# Patient Record
Sex: FEMALE | Race: WHITE | Employment: UNEMPLOYED | URBAN - METROPOLITAN AREA
[De-identification: names, ages, dates, MRNs, and addresses within clinical notes are randomized per-mention and may not be internally consistent; named-entity substitution may affect disease eponyms.]

---

## 2024-09-26 ENCOUNTER — OFFICE VISIT (OUTPATIENT)
Facility: LOCATION | Age: 64
End: 2024-09-26

## 2024-09-26 ENCOUNTER — OFFICE VISIT (OUTPATIENT)
Facility: LOCATION | Age: 64
End: 2024-09-26
Payer: MEDICARE

## 2024-09-26 VITALS — WEIGHT: 151.63 LBS | HEIGHT: 70 IN | BODY MASS INDEX: 21.71 KG/M2

## 2024-09-26 DIAGNOSIS — H93.293 ABNORMAL AUDITORY PERCEPTION OF BOTH EARS: ICD-10-CM

## 2024-09-26 DIAGNOSIS — H90.3 ASYMMETRIC SNHL (SENSORINEURAL HEARING LOSS): Primary | ICD-10-CM

## 2024-09-26 DIAGNOSIS — G43.001 MIGRAINE WITHOUT AURA AND WITH STATUS MIGRAINOSUS, NOT INTRACTABLE: ICD-10-CM

## 2024-09-26 DIAGNOSIS — H81.01 MENIERE'S DISEASE OF RIGHT EAR: Primary | ICD-10-CM

## 2024-09-26 DIAGNOSIS — H93.8X3 EAR FULLNESS, BILATERAL: ICD-10-CM

## 2024-09-26 PROCEDURE — 92557 COMPREHENSIVE HEARING TEST: CPT | Performed by: AUDIOLOGIST

## 2024-09-26 PROCEDURE — 92567 TYMPANOMETRY: CPT | Performed by: AUDIOLOGIST

## 2024-09-26 RX ORDER — TRIAMTERENE AND HYDROCHLOROTHIAZIDE 37.5; 25 MG/1; MG/1
1 CAPSULE ORAL DAILY
COMMUNITY
Start: 2022-11-01

## 2024-09-26 RX ORDER — PREDNISONE 20 MG/1
TABLET ORAL
Qty: 10 TABLET | Refills: 0 | Status: SHIPPED | OUTPATIENT
Start: 2024-09-26 | End: 2024-10-04

## 2024-09-26 RX ORDER — NORTRIPTYLINE HCL 10 MG
20 CAPSULE ORAL NIGHTLY
Qty: 60 CAPSULE | Refills: 2 | Status: SHIPPED | OUTPATIENT
Start: 2024-09-26

## 2024-09-26 RX ORDER — CALCIUM CARBONATE 500 MG/1
500 TABLET, CHEWABLE ORAL
COMMUNITY

## 2024-09-26 RX ORDER — ACETAMINOPHEN 325 MG/1
650 TABLET ORAL
COMMUNITY

## 2024-09-26 RX ORDER — ALENDRONATE SODIUM 70 MG/1
TABLET ORAL
COMMUNITY
Start: 2023-09-26

## 2024-09-26 RX ORDER — MULTIVIT-MIN/IRON/FOLIC ACID/K 18-600-40
1 CAPSULE ORAL DAILY
COMMUNITY

## 2024-09-26 NOTE — PROGRESS NOTES
NEW PATIENT PROGRESS NOTE  OTOLOGY/OTOLARYNGOLOGY    REF MD:  No referring provider defined for this encounter.     PCP: Nicholas Stinson MD    CHIEF COMPLAINT:    Chief Complaint   Patient presents with    Ear Problem     Pain, pressure, and fullness in both ears     LAST VISIT AT [facility] - 12/12/2022    HISTORY OF PRESENT ILLNESS: Korin Preston is a 64 year old female who presents for evaluation of ear fullness. Patient reports that she was fine until one week ago when she began experiencing pain, pressure, and a sensation of fullness in her ears, accompanied by some nausea. She denies any symptoms of Meniere's disease. She tried Dyazide, but it did not make a significant difference. She is not under much stress and cannot identify any specific triggers. She has also discontinued caffeine consumption. Allergy medications didn't help.     LAST HPI AND INTERVAL HISTORY PER [facility] NOTE:  INTERVAL HX 12/12/22: Patient returns for routine follow-up. She tapered off nortriptyline. No longer having headaches or ear pain. She has still has mild hypersensitivity to sound which is manageable. She is still taking Dyazide. Has labs checked yearly by her primary care physician. Denies vertigo. Patient feels hearing is stable.    HISTORY OF PRESENT ILLNESS 11/4/20: Korin Preston is a 60y female who presents for evaluation of otalgia. The patient has been having sharp ear pains near daily. This can be triggered by certain metallic or loud noises. She also has daily headaches which are over the whole head and forehead. No history of ear infections, otorrhea. Denies vertigo, tinnitus. Hearing was last checked in 02/2020 and showed a low frequency right hearing loss. The patient has previously been seen by Dr. Laureano and was found to have possible right Meniere's disease with which she has not had problems since starting dyazide.     PAST MEDICAL HISTORY:    Past Medical History:    Meniere's disease       PAST SURGICAL  HISTORY:    Past Surgical History:   Procedure Laterality Date    Cholecystectomy  2022       Current Outpatient Medications on File Prior to Visit   Medication Sig Dispense Refill    alendronate 70 MG Oral Tab 12      omeprazole 20 MG Oral Capsule Delayed Release Take 1 capsule (20 mg total) by mouth daily.      sertraline 50 MG Oral Tab 135      triamterene-hydroCHLOROthiazide 37.5-25 MG Oral Cap Take 1 capsule by mouth daily.      acetaminophen 325 MG Oral Tab Take 2 tablets (650 mg total) by mouth.      calcium carbonate 500 MG Oral Chew Tab Chew 1 tablet (500 mg total) by mouth.      Cholecalciferol (VITAMIN D) 50 MCG (2000 UT) Oral Cap Take 1 tablet by mouth daily.       No current facility-administered medications on file prior to visit.       Allergies:   Allergies   Allergen Reactions    Amoxicillin-Pot Clavulanate RASH     Tolerated ceftriaxone 5/24/22    Sulfamethoxazole W/Trimethoprim RASH       SOCIAL HISTORY:    Social History     Tobacco Use    Smoking status: Never    Smokeless tobacco: Never   Substance Use Topics    Alcohol use: Yes     Comment: Occ       FAMILY HISTORY: Denies known family history of hearing loss, tinnitus, vertigo, or migraine.  Denies known family history of head and neck cancer, thyroid cancer, bleeding disorders.     REVIEW OF SYSTEMS:   Positives are in bold  Neuro: Headache, facial weakness, facial numbness, neck pain, vertigo  ENT: Hearing change, tinnitus, otorrhea, otalgia, aural fullness, ear pressure, vertigo, imbalance  Sinus pressure, rhinorrhea, congestion, facial pain, jaw pain, dysphagia, odynophagia, sore throat, voice changes, shortness of breath    EXAMINATION:  I washed my hands with an alcohol-based hand gel prior to examination  Constitutional:   --Vitals: Height 5' 10\" (1.778 m), weight 151 lb 9.6 oz (68.8 kg).  --General: no apparent distress, well-developed, conversant  Psych: affect pleasant and appropriate for age, alert and oriented  Neuro: Facial  movement normal bilateral  Eyes: Pupils equal, symmetric and reactive to light.  Extra-ocular muscles intact  Respiratory: No stridor, stertor or increased work of breathing  ENT:  --Ear: The bilateral ears were examined under binocular microscopy  Right ear microscopic exam:  Pinna: Normal, no lesions or masses.  Mastoid: Nontender on palpation.   External auditory canal: Clear, no masses or lesions.  Tympanic membrane: Intact, no lesions, normal landmarks.  Middle ear: Aerated.    Left ear microscopic exam:  Pinna: Normal, no lesions or masses.  Mastoid: Nontender on palpation.   External auditory canal: Clear, no masses or lesions.  Tympanic membrane: Intact, no lesions, normal landmarks.  Middle ear: Aerated.    Audiogram (date 12/16/20) interpreted and reviewed with the patient today showing:  Puretones: Right: moderate to normal SNHL Left: normal to mild SNHL  SRT: Right: 20 Left: 15  SD: Right: 100 Left: 100    Audiogram (date 2/28/20) interpreted and reviewed with the patient today showing:  Puretones: Right: moderate to mild SNHL Left: normal to mild SNHL  Tymps: Right: A Left: A  SRT: Right: 20 Left: 10  SD: Right: 100 Left: 100    Audiogram (date 12/12/2022) interpreted and reviewed with the patient today showing:  Puretones: Right: mild to normal to mild SNHL Left: normal to mild SNHL  SRT: Right: 5 Left: 10  SD: Right: 100 Left: 100     MRI IACS wo Contrast 5/13/2020  IMPRESSION:  A few scattered nonspecific punctate foci of increased signal intensity are seen  in the subcortical white matter without diffusion weighted correlate.    Images through the posterior fossa demonstrate normal morphology of the seventh  8th nerve complex bilaterally.    Study was performed without contrast which somewhat limits the sensitivity for  small lesions.     Latest Audiogram Result (Hz) Exam performed: 9/26/2024 2:05 PM Last edited by Tita Park AUD on 9/26/2024 2:16 PM        125 250  1500 2000  3000 4000 6000 8000    Right air:  20 30  40  45  50 60 65    Left air:  10 10  10  10  15 35 35    Right mastoid bone (masked):   20  40  40  45         Reliability:  Good    Transducer:  Inserts, Bone Oscillator    Technique:  Conventional Audiometry    Comments:            Latest Speech Audiometry  Last edited by Tita Park AUD on 9/26/2024 2:16 PM       Ear Method PTA SAT SRT [facility] Test/list Score (%) Intensity Mask/noise Notes    right recorded   30   10 By Difficulty 92 70  masked    left recorded   10   10 By Difficulty 100 55                    Latest Tympanogram Result       Probe Tone (Hz): 226 Exam performed: 9/26/2024 2:05 PM Last edited by Tita Park AUD on 9/26/2024 2:16 PM      Tympanograms  These were drawn by a user, not generated from device data      Right Ear Left Ear                     Right Ear Left Ear    Tympanogram type: Type A Type A    Canal volume (mL): 1.7 1.8    Peak pressure (daPa): 0 -13    Peak amplitude (mmho): 0.4 0.4    Tympanogram width (daPa):        Comments:                    Latest Audiogram and Tympanogram Result Text  Last edited by Tita Park AUD on 9/26/2024  2:34 PM      Addendum      HISTORY:    Korin Preston, 64 year old, was seen today for hearing assessment as requested by otolaryngologist Amor Sanches MD secondary to complaints of bilateral ear fullness and previous diagnosis of Meniere's disease.   See ENT ROS intake form for detailed history.    RESULTS:    Tympanograms were consistent with normal pressure and compliance in each ear.      Pure tone air and bone conduction thresholds were consistent with mild to moderate-severe sensorineural hearing loss in the right ear and normal hearing sensitivity through 4000 Hz, sloping to mild hearing loss in the left ear.     SRT:  Right: 30 dB HL            Left :  10 dB HL    WRS:  Right: >92% at 70 dB HL             Left: 100% at 55 dB HL    RECOMMENDATIONS:  1.   Follow-up with Amor Sanches MD.   2. Audiological monitoring as needed during the course of medical management.         Addended by Tita Park, AUD on 9/26/2024  2:34 PM                ASSESSMENT/PLAN:  Korin Preston is a 64 year old female with     ICD-10-CM   1. Meniere's disease of right ear  H81.01   2. Migraine without aura and with status migrainosus, not intractable  G43.001        IMPRESSION:  Migraine without aura   Otalgia, bilateral, likely neurologic   Right Meniere's disease     PLAN:  - Restart nortriptyline 20 MG  - Possible side effects include fatigue, constipation, dry mouth, vivid dreams.  More rarely patients can experience increased blood pressure or tachycardia.  At this low-dose the medication can be stopped without a taper.  Most patients take the medication before bed as it can help them sleep well.  However if the patient feels that it keeps them awake at night they can take it during the daytime.   - Restart dyazide daily  - Start Prednisone burst and taper  Short term use risks include fluid retention, causing swelling in your lower legs, high blood pressure, mood swings, memory, behavior, and other psychological effects, such as confusion or delirium, upset stomach, increased blood sugar, and other less likely but serious side effects such as avascular necrosis   -Follow up in 1 month     Situation reviewed with the patient in detail.    Attention: This note has been scribed by Shilpa Hensley under the supervision of Amor Sanches MD.     Amor Sanches MD  Otology/Otolaryngology  [address] [place] [address]  Phone [phone]  Fax [phone]      I have personally performed the services described in this documentation. All medical record entries made by the scribe were at my direction and in my presence. I have reviewed the chart and agree that the medical record reflects my personal  performance and is accurate and complete.

## 2024-11-04 ENCOUNTER — OFFICE VISIT (OUTPATIENT)
Dept: OTOLARYNGOLOGY | Facility: CLINIC | Age: 64
End: 2024-11-04

## 2024-11-04 ENCOUNTER — OFFICE VISIT (OUTPATIENT)
Dept: AUDIOLOGY | Facility: CLINIC | Age: 64
End: 2024-11-04

## 2024-11-04 DIAGNOSIS — H92.03 OTALGIA OF BOTH EARS: ICD-10-CM

## 2024-11-04 DIAGNOSIS — G43.009 MIGRAINE WITHOUT AURA AND WITHOUT STATUS MIGRAINOSUS, NOT INTRACTABLE: Primary | ICD-10-CM

## 2024-11-04 DIAGNOSIS — H81.01 MENIERE'S DISEASE OF RIGHT EAR: ICD-10-CM

## 2024-11-04 DIAGNOSIS — H90.3 ASYMMETRICAL SENSORINEURAL HEARING LOSS: Primary | ICD-10-CM

## 2024-11-04 PROCEDURE — 99213 OFFICE O/P EST LOW 20 MIN: CPT | Performed by: OTOLARYNGOLOGY

## 2024-11-04 RX ORDER — TRIAMTERENE AND HYDROCHLOROTHIAZIDE 37.5; 25 MG/1; MG/1
1 CAPSULE ORAL DAILY
Qty: 90 CAPSULE | Refills: 1 | Status: SHIPPED | OUTPATIENT
Start: 2024-11-04

## 2024-11-04 RX ORDER — NORTRIPTYLINE HYDROCHLORIDE 10 MG/1
20 CAPSULE ORAL NIGHTLY
Qty: 180 CAPSULE | Refills: 1 | Status: SHIPPED | OUTPATIENT
Start: 2024-11-04

## 2024-11-04 NOTE — PROGRESS NOTES
PATIENT PROGRESS NOTE  OTOLOGY/OTOLARYNGOLOGY    REF MD:  No referring provider defined for this encounter.     PCP: Nicholas Stinson MD    CHIEF COMPLAINT:    Chief Complaint   Patient presents with    Follow - Up     Reevaluation on meniere's, pt reports medication has not worked as well as before.      LAST VISIT 9/26/2024  IMPRESSION:  Migraine without aura   Otalgia, bilateral, likely neurologic   Right Meniere's disease     PLAN:  - Restart nortriptyline 20 MG  - Possible side effects include fatigue, constipation, dry mouth, vivid dreams.  More rarely patients can experience increased blood pressure or tachycardia.  At this low-dose the medication can be stopped without a taper.  Most patients take the medication before bed as it can help them sleep well.  However if the patient feels that it keeps them awake at night they can take it during the daytime.   - Restart dyazide daily  - Start Prednisone burst and taper  Short term use risks include fluid retention, causing swelling in your lower legs, high blood pressure, mood swings, memory, behavior, and other psychological effects, such as confusion or delirium, upset stomach, increased blood sugar, and other less likely but serious side effects such as avascular necrosis   -Follow up in 1 month  _______________________________________________________________________  Interval History:  She reports main symptoms are ongoing pressure in her ears, and ear pressure fluctuation and hearing fluctuation. Sometimes ears feel like they are \"stuffed with cotton.\"  Ears can be painful when sounds are very loud. It feels similar to when getting off a plane and the ears won't pop, with the right ear being worse than the left. Overall, she states her symptoms are about 20% better, though they wax and wane. She initially experienced dry mouth, which has improved but is still present. Nausea has also improved. She tolerated the prednisone. Feels that she has some GERD and  sore throat, Negative COVID and Strep test. Has startted taking omeprazole. Patient has been taking Dyazide daily until she ran out 5 days ago.     HISTORY OF PRESENT ILLNESS: Korin Preston is a 64 year old female who presents for evaluation of ear fullness. Patient reports that she was fine until one week ago when she began experiencing pain, pressure, and a sensation of fullness in her ears, accompanied by some nausea. She denies any symptoms of Meniere's disease. She tried Dyazide, but it did not make a significant difference. She is not under much stress and cannot identify any specific triggers. She has also discontinued caffeine consumption. Allergy medications didn't help.     LAST HPI AND INTERVAL HISTORY PER [facility] NOTE:  INTERVAL HX 12/12/22: Patient returns for routine follow-up. She tapered off nortriptyline. No longer having headaches or ear pain. She has still has mild hypersensitivity to sound which is manageable. She is still taking Dyazide. Has labs checked yearly by her primary care physician. Denies vertigo. Patient feels hearing is stable.    HISTORY OF PRESENT ILLNESS 11/4/20: Korin Preston is a 60y female who presents for evaluation of otalgia. The patient has been having sharp ear pains near daily. This can be triggered by certain metallic or loud noises. She also has daily headaches which are over the whole head and forehead. No history of ear infections, otorrhea. Denies vertigo, tinnitus. Hearing was last checked in 02/2020 and showed a low frequency right hearing loss. The patient has previously been seen by Dr. Laureano and was found to have possible right Meniere's disease with which she has not had problems since starting dyazide.     PAST MEDICAL HISTORY:    Past Medical History:    Meniere's disease       PAST SURGICAL HISTORY:    Past Surgical History:   Procedure Laterality Date    Cholecystectomy  2022       Current Outpatient Medications on File Prior to Visit   Medication  Sig Dispense Refill    acetaminophen 325 MG Oral Tab Take 2 tablets (650 mg total) by mouth.      alendronate 70 MG Oral Tab 12      calcium carbonate 500 MG Oral Chew Tab Chew 1 tablet (500 mg total) by mouth.      Cholecalciferol (VITAMIN D) 50 MCG (2000 UT) Oral Cap Take 1 tablet by mouth daily.      omeprazole 20 MG Oral Capsule Delayed Release Take 1 capsule (20 mg total) by mouth daily.      sertraline 50 MG Oral Tab 135       No current facility-administered medications on file prior to visit.       Allergies:   Allergies   Allergen Reactions    Amoxicillin-Pot Clavulanate RASH     Tolerated ceftriaxone 5/24/22    Sulfamethoxazole W/Trimethoprim RASH       SOCIAL HISTORY:    Social History     Tobacco Use    Smoking status: Never    Smokeless tobacco: Never   Substance Use Topics    Alcohol use: Yes     Comment: Occ       FAMILY HISTORY: Denies known family history of hearing loss, tinnitus, vertigo, or migraine.  Denies known family history of head and neck cancer, thyroid cancer, bleeding disorders.     REVIEW OF SYSTEMS:   Positives are in bold  Neuro: Headache, facial weakness, facial numbness, neck pain, vertigo  ENT: Hearing change, tinnitus, otorrhea, otalgia, aural fullness, ear pressure, vertigo, imbalance  Sinus pressure, rhinorrhea, congestion, facial pain, jaw pain, dysphagia, odynophagia, sore throat, voice changes, shortness of breath    EXAMINATION:  I washed my hands with an alcohol-based hand gel prior to examination  Constitutional:   --Vitals: There were no vitals taken for this visit.  --General: no apparent distress, well-developed, conversant  Psych: affect pleasant and appropriate for age, alert and oriented  Neuro: Facial movement normal bilateral  Eyes: Pupils equal, symmetric and reactive to light.  Extra-ocular muscles intact  Respiratory: No stridor, stertor or increased work of breathing  ENT:  --Ear: The bilateral ears were examined under binocular microscopy  Right ear  microscopic exam:  Pinna: Normal, no lesions or masses.  Mastoid: Nontender on palpation.   External auditory canal: Clear, no masses or lesions.  Tympanic membrane: Intact, no lesions, normal landmarks.  Middle ear: Aerated.    Left ear microscopic exam:  Pinna: Normal, no lesions or masses.  Mastoid: Nontender on palpation.   External auditory canal: Clear, no masses or lesions.  Tympanic membrane: Intact, no lesions, normal landmarks.  Middle ear: Aerated.    Audiogram (date 12/16/20) interpreted and reviewed with the patient today showing:  Puretones: Right: moderate to normal SNHL Left: normal to mild SNHL  SRT: Right: 20 Left: 15  SD: Right: 100 Left: 100    Audiogram (date 2/28/20) interpreted and reviewed with the patient today showing:  Puretones: Right: moderate to mild SNHL Left: normal to mild SNHL  Tymps: Right: A Left: A  SRT: Right: 20 Left: 10  SD: Right: 100 Left: 100    Audiogram (date 12/12/2022) interpreted and reviewed with the patient today showing:  Puretones: Right: mild to normal to mild SNHL Left: normal to mild SNHL  SRT: Right: 5 Left: 10  SD: Right: 100 Left: 100     MRI IACS wo Contrast 5/13/2020  IMPRESSION:  A few scattered nonspecific punctate foci of increased signal intensity are seen  in the subcortical white matter without diffusion weighted correlate.    Images through the posterior fossa demonstrate normal morphology of the seventh  8th nerve complex bilaterally.    Study was performed without contrast which somewhat limits the sensitivity for  small lesions.     Latest Audiogram Result (Hz) Exam performed: 11/4/2024 9:30 AM Last edited by Albina Domínguez Au.D on 11/4/2024 9:35 AM        125 250  1500 2000 3000 4000 6000 8000    Right air:  15 25  35  40  35 50 50    Right mastoid bone (masked):   25  40  30  30         Reliability:  Fair    Transducer:  Inserts    Technique:  Conventional Audiometry    Comments:            Latest Speech Audiometry  Last edited by  Albina Domínguez Au.D on 11/4/2024 9:35 AM       Ear Method PTA SAT SRT [facility] Test/list Score (%) Intensity Mask/noise Notes    right live voice   30   10 By Difficulty 100 65  masked                  Latest Tympanogram Result       Probe Tone (Hz): 226 Exam performed: 11/4/2024 9:31 AM Last edited by Albina Domínguez Au.D on 11/4/2024 9:35 AM      Right Ear Tympanogram  This tympanogram was drawn by a user, not generated by device data                  Right Ear Left Ear    Tympanogram type: Type A     Canal volume (mL): 1.6     Peak pressure (daPa): 1     Peak amplitude (mL): 0.54     Tympanogram width (daPa):        Comments:                    Latest Audiogram and Tympanogram Result Text  Last edited by Albina Domínguez Au.D on 11/4/2024  9:52 AM      Addendum      Right ear testing only per Dr. Randolph    Otoscopic Inspection:  both ears: no cerumen and TM visualized    Summary  Right; Mild SNHL    Normal tympanogram for the right ear       Follow up with Amor Sanches M.D..  Audiological monitoring as needed during the course of medical management.       Addended by Albina Domínguez Au.D on 11/4/2024  9:52 AM                ASSESSMENT/PLAN:  Korin Preston is a 64 year old female with     ICD-10-CM   1. Hearing loss, unspecified hearing loss type, unspecified laterality  H91.90   2. Migraine without aura and without status migrainosus, not intractable  G43.009   3. Otalgia of both ears  H92.03   4. Meniere's disease of right ear  H81.01          IMPRESSION:  Migraine without aura   Otalgia, bilateral, likely neurologic   Right Meniere's disease     PLAN:  - Audiogram reviewed with patient, right ear hearing is stable from last   - Will continue current medications and monitor if weather changes lead to more stable symptoms  - Continue nortriptyline 20 MG  - Possible side effects include fatigue, constipation, dry mouth, vivid dreams.  More rarely patients can experience increased blood pressure or  tachycardia.  At this low-dose the medication can be stopped without a taper.  Most patients take the medication before bed as it can help them sleep well.  However if the patient feels that it keeps them awake at night they can take it during the daytime.   - Continue dyazide daily - refilled   - Follow up in 6 weeks     Situation reviewed with the patient in detail.    Attention: This note has been scribed by Shilpa Hensley under the supervision of Amor Sanches MD.     Amor Sanches MD  Otology/Otolaryngology  [facility] [address] [place] [address]  Phone [phone]  Fax [phone]      I have personally performed the services described in this documentation. All medical record entries made by the scribe were at my direction and in my presence. I have reviewed the chart and agree that the medical record reflects my personal performance and is accurate and complete.

## 2024-12-16 ENCOUNTER — OFFICE VISIT (OUTPATIENT)
Dept: OTOLARYNGOLOGY | Facility: CLINIC | Age: 64
End: 2024-12-16

## 2024-12-16 DIAGNOSIS — H92.03 OTALGIA OF BOTH EARS: ICD-10-CM

## 2024-12-16 DIAGNOSIS — H81.01 MENIERE'S DISEASE, RIGHT: ICD-10-CM

## 2024-12-16 DIAGNOSIS — G43.009 MIGRAINE WITHOUT AURA AND WITHOUT STATUS MIGRAINOSUS, NOT INTRACTABLE: Primary | ICD-10-CM

## 2024-12-16 PROCEDURE — 99213 OFFICE O/P EST LOW 20 MIN: CPT | Performed by: OTOLARYNGOLOGY

## 2024-12-16 NOTE — PROGRESS NOTES
PATIENT PROGRESS NOTE  OTOLOGY/OTOLARYNGOLOGY    REF MD:  No referring provider defined for this encounter.     PCP: Nicholas Stinson MD    CHIEF COMPLAINT:    Chief Complaint   Patient presents with    Follow - Up     F/up Meniere's disease  Pt reports symptoms have been up and down     LAST VISIT 11/04/2024  IMPRESSION:  Migraine without aura   Otalgia, bilateral, likely neurologic   Right Meniere's disease     PLAN:  - Audiogram reviewed with patient, right ear hearing is stable from last   - Will continue current medications and monitor if weather changes lead to more stable symptoms  - Continue nortriptyline 20 MG  - Possible side effects include fatigue, constipation, dry mouth, vivid dreams.  More rarely patients can experience increased blood pressure or tachycardia.  At this low-dose the medication can be stopped without a taper.  Most patients take the medication before bed as it can help them sleep well.  However if the patient feels that it keeps them awake at night they can take it during the daytime.   - Continue dyazide daily - refilled   - Follow up in 6 weeks   ____________________________________________________________________________________________  Interval History:  Patient is still experiencing symptoms but reports less dry mouth. The pressure in her ears fluctuates but has generally improved. She is no longer taking Dyazide and has been off it for 2 weeks due to trialing off of it because of concerns related to her calcium absorption and osteoporosis. She states she did not intend to stop it indefinitely. She feels the same without it now. She woke up this morning with a headache and pressure in her ears, which she suspects might be related to the weather. She experiences a little bit of tinnitus that comes and goes. Denies vertigo.     HISTORY OF PRESENT ILLNESS: Korin Preston is a 64 year old female who presents for evaluation of ear fullness. Patient reports that she was fine until  one week ago when she began experiencing pain, pressure, and a sensation of fullness in her ears, accompanied by some nausea. She denies any symptoms of Meniere's disease. She tried Dyazide, but it did not make a significant difference. She is not under much stress and cannot identify any specific triggers. She has also discontinued caffeine consumption. Allergy medications didn't help.     LAST HPI AND INTERVAL HISTORY PER MILIND NOTE:  INTERVAL HX 12/12/22: Patient returns for routine follow-up. She tapered off nortriptyline. No longer having headaches or ear pain. She has still has mild hypersensitivity to sound which is manageable. She is still taking Dyazide. Has labs checked yearly by her primary care physician. Denies vertigo. Patient feels hearing is stable.    HISTORY OF PRESENT ILLNESS 11/4/20: Korin Preston is a 60y female who presents for evaluation of otalgia. The patient has been having sharp ear pains near daily. This can be triggered by certain metallic or loud noises. She also has daily headaches which are over the whole head and forehead. No history of ear infections, otorrhea. Denies vertigo, tinnitus. Hearing was last checked in 02/2020 and showed a low frequency right hearing loss. The patient has previously been seen by Dr. Laureano and was found to have possible right Meniere's disease with which she has not had problems since starting dyazide.     PAST MEDICAL HISTORY:    Past Medical History:    Meniere's disease       PAST SURGICAL HISTORY:    Past Surgical History:   Procedure Laterality Date    Cholecystectomy  2022       Current Outpatient Medications on File Prior to Visit   Medication Sig Dispense Refill    triamterene-hydroCHLOROthiazide 37.5-25 MG Oral Cap Take 1 capsule by mouth daily. 90 capsule 1    nortriptyline 10 MG Oral Cap Take 2 capsules (20 mg total) by mouth nightly. 180 capsule 1    acetaminophen 325 MG Oral Tab Take 2 tablets (650 mg total) by mouth.      alendronate  70 MG Oral Tab 12      calcium carbonate 500 MG Oral Chew Tab Chew 1 tablet (500 mg total) by mouth.      Cholecalciferol (VITAMIN D) 50 MCG (2000 UT) Oral Cap Take 1 tablet by mouth daily.      omeprazole 20 MG Oral Capsule Delayed Release Take 1 capsule (20 mg total) by mouth daily.      sertraline 50 MG Oral Tab 135       No current facility-administered medications on file prior to visit.       Allergies:   Allergies   Allergen Reactions    Amoxicillin-Pot Clavulanate RASH     Tolerated ceftriaxone 5/24/22    Sulfamethoxazole W/Trimethoprim RASH       SOCIAL HISTORY:    Social History     Tobacco Use    Smoking status: Never    Smokeless tobacco: Never   Substance Use Topics    Alcohol use: Yes     Comment: Occ       FAMILY HISTORY: Denies known family history of hearing loss, tinnitus, vertigo, or migraine.  Denies known family history of head and neck cancer, thyroid cancer, bleeding disorders.     REVIEW OF SYSTEMS:   Positives are in bold  Neuro: Headache, facial weakness, facial numbness, neck pain, vertigo  ENT: Hearing change, tinnitus, otorrhea, otalgia, aural fullness, ear pressure, vertigo, imbalance  Sinus pressure, rhinorrhea, congestion, facial pain, jaw pain, dysphagia, odynophagia, sore throat, voice changes, shortness of breath    EXAMINATION:  I washed my hands with an alcohol-based hand gel prior to examination  Constitutional:   --Vitals: There were no vitals taken for this visit.  --General: no apparent distress, well-developed, conversant  Psych: affect pleasant and appropriate for age, alert and oriented  Neuro: Facial movement normal bilateral  Eyes: Pupils equal, symmetric and reactive to light.  Extra-ocular muscles intact  Respiratory: No stridor, stertor or increased work of breathing  ENT:  --Ear: The bilateral ears were examined under binocular microscopy  Right ear microscopic exam:  Pinna: Normal, no lesions or masses.  Mastoid: Nontender on palpation.   External auditory canal:  Clear, no masses or lesions.  Tympanic membrane: Intact, no lesions, normal landmarks.  Middle ear: Aerated.    Left ear microscopic exam:  Pinna: Normal, no lesions or masses.  Mastoid: Nontender on palpation.   External auditory canal: Clear, no masses or lesions.  Tympanic membrane: Intact, no lesions, normal landmarks.  Middle ear: Aerated.    Audiogram (date 12/16/20) interpreted and reviewed with the patient today showing:  Puretones: Right: moderate to normal SNHL Left: normal to mild SNHL  SRT: Right: 20 Left: 15  SD: Right: 100 Left: 100    Audiogram (date 2/28/20) interpreted and reviewed with the patient today showing:  Puretones: Right: moderate to mild SNHL Left: normal to mild SNHL  Tymps: Right: A Left: A  SRT: Right: 20 Left: 10  SD: Right: 100 Left: 100    Audiogram (date 12/12/2022) interpreted and reviewed with the patient today showing:  Puretones: Right: mild to normal to mild SNHL Left: normal to mild SNHL  SRT: Right: 5 Left: 10  SD: Right: 100 Left: 100     MRI IACS wo Contrast 5/13/2020  IMPRESSION:  A few scattered nonspecific punctate foci of increased signal intensity are seen  in the subcortical white matter without diffusion weighted correlate.    Images through the posterior fossa demonstrate normal morphology of the seventh  8th nerve complex bilaterally.    Study was performed without contrast which somewhat limits the sensitivity for  small lesions.     Latest Audiogram Result (Hz) Exam performed: 11/4/2024 9:30 AM Last edited by Albina Domínguez Au.D on 11/4/2024 9:35 AM        125 250  1500 2000 3000 4000 6000 8000    Right air:  15 25  35  40  35 50 50    Right mastoid bone (masked):   25  40  30  30         Reliability:  Fair    Transducer:  Inserts    Technique:  Conventional Audiometry    Comments:            Latest Speech Audiometry  Last edited by Albina Domínguez Au.D on 11/4/2024 9:35 AM       Ear Method PTA SAT SRT [facility] Test/list Score (%) Intensity  Mask/noise Notes    right live voice   30   10 By Difficulty 100 65  masked                  Latest Tympanogram Result       Probe Tone (Hz): 226 Exam performed: 11/4/2024 9:31 AM Last edited by Albina Domínguez Au.D on 11/4/2024 9:35 AM      Right Ear Tympanogram  This tympanogram was drawn by a user, not generated by device data                  Right Ear Left Ear    Tympanogram type: Type A     Canal volume (mL): 1.6     Peak pressure (daPa): 1     Peak amplitude (mL): 0.54     Tympanogram width (daPa):        Comments:                    Latest Audiogram and Tympanogram Result Text  Last edited by Albina Domínguez Au.D on 11/4/2024  9:52 AM      Addendum      Right ear testing only per Dr. Randolph    Otoscopic Inspection:  both ears: no cerumen and TM visualized    Summary  Right; Mild SNHL    Normal tympanogram for the right ear       Follow up with Amor Sanches M.D..  Audiological monitoring as needed during the course of medical management.       Addended by Albina Domínguez Au.D on 11/4/2024  9:52 AM                ASSESSMENT/PLAN:  Korin Preston is a 64 year old female with     ICD-10-CM   1. Migraine without aura and without status migrainosus, not intractable  G43.009   2. Otalgia of both ears  H92.03   3. Meniere's disease, right  H81.01            IMPRESSION:  Migraine without aura   Otalgia, bilateral, likely neurologic   Right Meniere's disease     PLAN:  - Will continue current medications and monitor if weather changes lead to more stable symptoms  - Continue nortriptyline 20 MG  - Possible side effects include fatigue, constipation, dry mouth, vivid dreams.  More rarely patients can experience increased blood pressure or tachycardia.  At this low-dose the medication can be stopped without a taper.  Most patients take the medication before bed as it can help them sleep well.  However if the patient feels that it keeps them awake at night they can take it during the daytime.   - Continue  dyazide daily - refilled   - Follow up in 6 weeks     Situation reviewed with the patient in detail.    Attention: This note has been scribed by Shilpa Hensley under the supervision of Amor Sanches MD.     Amor Sanches MD  Otology/Otolaryngology  [address] [place] [id number]  Phone [phone]  Fax [phone]      I have personally performed the services described in this documentation. All medical record entries made by the scribe were at my direction and in my presence. I have reviewed the chart and agree that the medical record reflects my personal performance and is accurate and complete.

## 2025-03-10 ENCOUNTER — TELEPHONE (OUTPATIENT)
Dept: OTOLARYNGOLOGY | Facility: CLINIC | Age: 65
End: 2025-03-10

## 2025-03-17 ENCOUNTER — TELEPHONE (OUTPATIENT)
Dept: FAMILY MEDICINE CLINIC | Facility: CLINIC | Age: 65
End: 2025-03-17

## 2025-03-17 NOTE — TELEPHONE ENCOUNTER
Please enter lab orders for the patient's upcoming physical appointment.     Physical scheduled:   Your appointments       Date & Time Appointment Department ([place])    Apr 15, 2025 12:30 PM CDT Medicare Annual Well Visit with Tania Concepcion MD [facility] ([facility])              Formerly [facility]  [address] [place] [address] [place] [id number]  [phone]           Preferred lab: [facility] ([facility])     The patient has been notified to complete fasting labs prior to their physical appointment.

## 2025-04-15 ENCOUNTER — OFFICE VISIT (OUTPATIENT)
Dept: FAMILY MEDICINE CLINIC | Facility: CLINIC | Age: 65
End: 2025-04-15
Payer: MEDICARE

## 2025-04-15 VITALS
HEIGHT: 70 IN | HEART RATE: 73 BPM | TEMPERATURE: 98 F | SYSTOLIC BLOOD PRESSURE: 110 MMHG | WEIGHT: 142.81 LBS | BODY MASS INDEX: 20.45 KG/M2 | DIASTOLIC BLOOD PRESSURE: 70 MMHG

## 2025-04-15 DIAGNOSIS — M81.0 AGE-RELATED OSTEOPOROSIS WITHOUT CURRENT PATHOLOGICAL FRACTURE: ICD-10-CM

## 2025-04-15 DIAGNOSIS — H81.03 MENIERE'S DISEASE OF BOTH EARS: ICD-10-CM

## 2025-04-15 DIAGNOSIS — Z79.890 HORMONE REPLACEMENT THERAPY (HRT): ICD-10-CM

## 2025-04-15 DIAGNOSIS — F32.5 MAJOR DEPRESSIVE DISORDER WITH SINGLE EPISODE, IN FULL REMISSION: ICD-10-CM

## 2025-04-15 DIAGNOSIS — Z00.00 ENCOUNTER FOR ANNUAL HEALTH EXAMINATION: Primary | ICD-10-CM

## 2025-04-15 RX ORDER — ESTRADIOL 0.03 MG/D
1 FILM, EXTENDED RELEASE TRANSDERMAL
COMMUNITY

## 2025-04-15 RX ORDER — PROGESTERONE 100 MG/1
100 CAPSULE ORAL NIGHTLY
COMMUNITY
Start: 2025-02-02

## 2025-04-15 RX ORDER — BUPROPION HYDROCHLORIDE 150 MG/1
150 TABLET ORAL DAILY
Qty: 90 TABLET | Refills: 0 | Status: SHIPPED | OUTPATIENT
Start: 2025-04-15

## 2025-04-15 NOTE — PROGRESS NOTES
Subjective:   Korin Preston is a 64 year old female who presents for a Medicare Subsequent Annual Wellness visit (Pt already had Initial Annual Wellness) and scheduled follow up of multiple significant but stable problems.     Pt is new to the office.     Health Maintenance  Elinor: 10/2024  Colon: 1/30/25 - repeat 5 years  DEXA:  10/2024-osteoporosis in femoral necks  Prevnar: 9/2023  Shingrix:  12/2019, 6/2020  Tdap: 10/2022  Flu: /    Depression:  sertraline 75mg.  Has some ADHD sx, Plan trial of wellbtrin.   Sees GYN, Dr. Brooks.  Took HRT age 50-60, off for 5 years then back on On estradiol patch and oral progesterone.   Hair loss:  sees derm treated with oral minoxidil.  Meniere's/auditory migraines:  sees ENT.  Nortriptyline, dyazide  Osteoporosis:  sees endo.  Previously on fosamax 7349-3374; HRT 1574-6267. Began reclast 1/2025    History/Other:   Fall Risk Assessment:   She has been screened for Falls and is low risk.      Cognitive Assessment:   She had a completely normal cognitive assessment - see flowsheet entries       Functional Ability/Status:   Korin Preston has some abnormal functions as listed below:  She has Hearing problems based on screening of functional status.She has problems with Memory based on screening of functional status.       Depression Screening (PHQ):  PHQ-9 TOTAL SCORE: 3  , done 4/15/2025   Trouble falling or staying asleep, or sleeping too much: 1     Trouble concentrating on things, such as reading the newspaper or watching television: 1    Moving or speaking so slowly that other people could have noticed. Or the opposite - being so fidgety or restless that you have been moving around a lot more than usual: 1    If you checked off any problems, how difficult have these problems made it for you to do your work, take care of things at home, or get along with other people?: Somewhat difficult    Advanced Directives:   She does have a Living Will but we do NOT have it on  file in Epic.    She does NOT have a Power of  for Health Care. [Do you have a healthcare power of ?: No]      Problem List[1]  Allergies:  She is allergic to amoxicillin-pot clavulanate and sulfamethoxazole w/trimethoprim.    Current Medications:  Active Meds, Sig Only[2]    Medical History:  She  has a past medical history of Meniere's disease.  Surgical History:  She  has a past surgical history that includes cholecystectomy (2022).   Family History:  Her family history is not on file.  Social History:  She  reports that she quit smoking about 36 years ago. Her smoking use included cigarettes. She has quit using smokeless tobacco. She reports current alcohol use. She reports that she does not use drugs.    Tobacco:  She smoked tobacco in the past but quit greater than 12 months ago.  Tobacco Use[3]     CAGE Alcohol Screen:   CAGE screening score of 0 on 4/15/2025, showing low risk of alcohol abuse.      Patient Care Team:  Tania Concepcion MD as PCP - General (Family Medicine)    Review of Systems  GEN:  No fever or fatigue  HEENT:  No vision or hearing concerns.  No dental problems.  HEART:  No chest pain or palpitations  LUNG:  No SOB, cough or wheeze  GI:  No abdominal pain.  No N/V/D/C  :  No dysuria  EXT:  No joint pain.  No LE swelling  PSYCH:  No mood concerns or anxiety    Objective:   Physical Exam  GEN:  Alert, NAD  NECK:  No LAD, no thyromegaly  HEART:  RRR, no MRGs  LUNG:  CTA bilaterally, no RRWs  AB:  Soft, nontender, nondistended.  No palpable masses  EXT: no pedal edema  PSYCH:  Mood appropriate    /70   Pulse 73   Temp 97.8 °F (36.6 °C)   Ht 5' 10\" (1.778 m)   Wt 142 lb 12.8 oz (64.8 kg)   BMI 20.49 kg/m²  Estimated body mass index is 20.49 kg/m² as calculated from the following:    Height as of this encounter: 5' 10\" (1.778 m).    Weight as of this encounter: 142 lb 12.8 oz (64.8 kg).    Medicare Hearing Assessment:   Hearing Screening    Screening Method: Whisper  Test  Whisper Test Result: Pass (Comment: meniere's disease right ear)         Visual Acuity:   Right Eye Visual Acuity: Uncorrected Right Eye Chart Acuity: 20/50   Left Eye Visual Acuity: Uncorrected Left Eye Chart Acuity: 20/50   Both Eyes Visual Acuity: Uncorrected Both Eyes Chart Acuity: 20/40   Able To Tolerate Visual Acuity: Yes        Assessment & Plan:   Korin Preston is a 64 year old female who presents for a Medicare Assessment.     1. Encounter for annual health examination (Primary)  2. Meniere's disease of both ears  3. Major depressive disorder with single episode, in full remission  4. Hormone replacement therapy (HRT)  5. Age-related osteoporosis without current pathological fracture  Other orders  -     buPROPion HCl ER (XL); Take 1 tablet (150 mg total) by mouth daily.  Dispense: 90 tablet; Refill: 0          The patient indicates understanding of these issues and agrees to the plan.  Reinforced healthy diet, lifestyle, and exercise.      Return in 1 year (on 4/15/2026).     Tania Concepcion MD    Supplementary Documentation:   General Health:  In the past six months, have you lost more than 10 pounds without trying?: 2 - No  Has your appetite been poor?: No  Type of Diet: Balanced  How does the patient maintain a good energy level?: Appropriate Exercise  How would you describe your daily physical activity?: Moderate  How would you describe your current health state?: Good  How do you maintain positive mental well-being?: Social Interaction, Visiting Friends, Visiting Family  On a scale of 0 to 10, with 0 being no pain and 10 being severe pain, what is your pain level?: 2 - (Mild) (ears)  In the past six months, have you experienced urine leakage?: 1-Yes  At any time do you feel concerned for the safety/well-being of yourself and/or your children, in your home or elsewhere?: No  Have you had any immunizations at another office such as Influenza, Hepatitis B, Tetanus, or Pneumococcal?:  Yes    Health Maintenance   Topic Date Due    Annual Physical  Never done    Colorectal Cancer Screening  Never done    Mammogram  Never done    DEXA Scan  Never done    COVID-19 Vaccine ( season) 2024    Annual Depression Screening  Never done    Influenza Vaccine (Season Ended) 10/01/2025    Fall Risk Screening (Annual)  Completed    Pneumococcal Vaccine: 50+ Years  Completed    Zoster Vaccines  Completed    Meningococcal B Vaccine  Aged Out            [1]   Patient Active Problem List  Diagnosis    Asymmetric SNHL (sensorineural hearing loss)    Meniere's disease of both ears    Major depressive disorder with single episode, in full remission    Hormone replacement therapy (HRT)    Age-related osteoporosis without current pathological fracture   [2]   Outpatient Medications Marked as Taking for the 4/15/25 encounter (Office Visit) with Tania Concepcion MD   Medication Sig    estradiol 0.025 MG/24HR Transdermal Patch Biweekly 1 patch twice a week.    progesterone 100 MG Oral Cap Take 1 capsule (100 mg total) by mouth nightly.    buPROPion  MG Oral Tablet 24 Hr Take 1 tablet (150 mg total) by mouth daily.    triamterene-hydroCHLOROthiazide 37.5-25 MG Oral Cap Take 1 capsule by mouth daily.    nortriptyline 10 MG Oral Cap Take 2 capsules (20 mg total) by mouth nightly.    acetaminophen 325 MG Oral Tab Take 2 tablets (650 mg total) by mouth.    calcium carbonate 500 MG Oral Chew Tab Chew 1 tablet (500 mg total) by mouth.    Cholecalciferol (VITAMIN D) 50 MCG (2000 UT) Oral Cap Take 1 tablet by mouth daily.    omeprazole 20 MG Oral Capsule Delayed Release Take 1 capsule (20 mg total) by mouth daily.    sertraline 50 MG Oral Tab 135   [3]   Social History  Tobacco Use   Smoking Status Former    Current packs/day: 0.00    Types: Cigarettes    Quit date:     Years since quittin.3   Smokeless Tobacco Former

## 2025-04-15 NOTE — PATIENT INSTRUCTIONS
A heart scan, a CT scan of the heart, is the safest and most accurate screening tool for detecting the early build-up of calcium in the coronary arteries, the most common cause of heart disease. This simple, painless and potentially lifesaving test takes just 15 minutes.    To schedule call [phone]    Heart scan locations:  [facility]  [facility] (enter through the Emergency Dept.)    Make an appointment for a heart scan if you are male over age 40 or a female over age 45, and have one or more of these risk factors:  High blood pressure  High cholesterol  Smoking  Obesity  Diabetes  Family history of heart disease

## 2025-04-16 ENCOUNTER — OFFICE VISIT (OUTPATIENT)
Dept: AUDIOLOGY | Facility: CLINIC | Age: 65
End: 2025-04-16

## 2025-04-16 ENCOUNTER — OFFICE VISIT (OUTPATIENT)
Dept: OTOLARYNGOLOGY | Facility: CLINIC | Age: 65
End: 2025-04-16
Payer: MEDICARE

## 2025-04-16 DIAGNOSIS — H83.8X1 SUPERIOR SEMICIRCULAR CANAL DEHISCENCE OF RIGHT EAR: ICD-10-CM

## 2025-04-16 DIAGNOSIS — H92.03 OTALGIA OF BOTH EARS: ICD-10-CM

## 2025-04-16 DIAGNOSIS — H90.41 SENSORINEURAL HEARING LOSS (SNHL) OF RIGHT EAR WITH UNRESTRICTED HEARING OF LEFT EAR: Primary | ICD-10-CM

## 2025-04-16 DIAGNOSIS — G43.009 MIGRAINE WITHOUT AURA AND WITHOUT STATUS MIGRAINOSUS, NOT INTRACTABLE: ICD-10-CM

## 2025-04-16 DIAGNOSIS — H90.3 ASYMMETRICAL SENSORINEURAL HEARING LOSS: Primary | ICD-10-CM

## 2025-04-16 DIAGNOSIS — H81.01 MENIERE'S DISEASE, RIGHT: ICD-10-CM

## 2025-04-16 PROCEDURE — 92557 COMPREHENSIVE HEARING TEST: CPT | Performed by: AUDIOLOGIST

## 2025-04-16 PROCEDURE — 99214 OFFICE O/P EST MOD 30 MIN: CPT | Performed by: OTOLARYNGOLOGY

## 2025-04-16 PROCEDURE — 92567 TYMPANOMETRY: CPT | Performed by: AUDIOLOGIST

## 2025-04-16 RX ORDER — TRIAMTERENE AND HYDROCHLOROTHIAZIDE 37.5; 25 MG/1; MG/1
1 CAPSULE ORAL DAILY
Qty: 90 CAPSULE | Refills: 3 | Status: SHIPPED | OUTPATIENT
Start: 2025-04-16

## 2025-04-16 RX ORDER — NORTRIPTYLINE HYDROCHLORIDE 10 MG/1
20 CAPSULE ORAL NIGHTLY
Qty: 180 CAPSULE | Refills: 3 | Status: SHIPPED | OUTPATIENT
Start: 2025-04-16

## 2025-04-16 NOTE — PROGRESS NOTES
PATIENT PROGRESS NOTE  OTOLOGY/OTOLARYNGOLOGY    REF MD:  No referring provider defined for this encounter.     PCP: Tania Concepcion MD    CHIEF COMPLAINT:    Chief Complaint   Patient presents with    Follow - Up     Reevaluation 6 week for migraine without aura, reports right ear pain and hearing loss      History of Present Illness  Ms. Korin Preston is a 64-year-old female with Meniere's disease who presents with concerns about worsening hearing loss.    She perceives a decline in hearing, particularly in the right ear, although recent audiometric tests show only a slight worsening of about 5 to 7 decibels. There is a noted ten decibel difference in the lowest frequencies compared to previous tests. Despite these changes, her hearing clarity remains at 100%.    She experiences ear pressure, squeaking sounds when moving her head, and increased ringing in the right ear. She describes the ear as feeling 'really full' and mentions that it 'hurts real bad'. These symptoms are reminiscent of past episodes when exposed to loud noises, which led to the initiation of nortriptyline treatment.    Her current medications include nortriptyline and dyazide. She had previously stopped dyazide due to concerns about osteoporosis but resumed it after noticing symptom improvement. She also takes sertraline and has recently been prescribed Wellbutrin, though she has not started it yet.    No dizziness or headaches are reported, but she experiences a persistent dry mouth, which she prefers over experiencing vertigo or ear pain.    A past MRI in 2020 was conducted to rule out a tumor due to asymmetric sensorineural hearing loss, which was negative. She has not had a CT scan of her ear yet.    PAST MEDICAL HISTORY:    Past Medical History:    Meniere's disease       PAST SURGICAL HISTORY:    Past Surgical History:   Procedure Laterality Date    Cholecystectomy  2022       Current Outpatient Medications on File Prior to Visit    Medication Sig Dispense Refill    estradiol 0.025 MG/24HR Transdermal Patch Biweekly 1 patch twice a week.      progesterone 100 MG Oral Cap Take 1 capsule (100 mg total) by mouth nightly.      buPROPion  MG Oral Tablet 24 Hr Take 1 tablet (150 mg total) by mouth daily. 90 tablet 0    acetaminophen 325 MG Oral Tab Take 2 tablets (650 mg total) by mouth.      calcium carbonate 500 MG Oral Chew Tab Chew 1 tablet (500 mg total) by mouth.      Cholecalciferol (VITAMIN D) 50 MCG ( UT) Oral Cap Take 1 tablet by mouth daily.      omeprazole 20 MG Oral Capsule Delayed Release Take 1 capsule (20 mg total) by mouth daily.      sertraline 50 MG Oral Tab 135       No current facility-administered medications on file prior to visit.       Allergies:   Allergies   Allergen Reactions    Amoxicillin-Pot Clavulanate RASH     Tolerated ceftriaxone 22    Sulfamethoxazole W/Trimethoprim RASH       SOCIAL HISTORY:    Social History     Tobacco Use    Smoking status: Former     Current packs/day: 0.00     Types: Cigarettes     Quit date:      Years since quittin.3    Smokeless tobacco: Former   Substance Use Topics    Alcohol use: Yes     Comment: Occ       FAMILY HISTORY: Denies known family history of hearing loss, tinnitus, vertigo, or migraine.  Denies known family history of head and neck cancer, thyroid cancer, bleeding disorders.     REVIEW OF SYSTEMS:   Positives are in bold  Neuro: Headache, facial weakness, facial numbness, neck pain, vertigo  ENT: Hearing change, tinnitus, otorrhea, otalgia, aural fullness, ear pressure, vertigo, imbalance  Sinus pressure, rhinorrhea, congestion, facial pain, jaw pain, dysphagia, odynophagia, sore throat, voice changes, shortness of breath    EXAMINATION:  I washed my hands with an alcohol-based hand gel prior to examination  Constitutional:   --Vitals: There were no vitals taken for this visit.  --General: no apparent distress, well-developed, conversant  Psych:  affect pleasant and appropriate for age, alert and oriented  Neuro: Facial movement normal bilateral  Eyes: Pupils equal, symmetric and reactive to light.  Extra-ocular muscles intact  Respiratory: No stridor, stertor or increased work of breathing  ENT:  --Ear: The bilateral ears were examined under binocular microscopy  Right ear microscopic exam:  Pinna: Normal, no lesions or masses.  Mastoid: Nontender on palpation.   External auditory canal: Clear, no masses or lesions.  Tympanic membrane: Intact, no lesions, normal landmarks.  Middle ear: Aerated.    Left ear microscopic exam:  Pinna: Normal, no lesions or masses.  Mastoid: Nontender on palpation.   External auditory canal: Clear, no masses or lesions.  Tympanic membrane: Intact, no lesions, normal landmarks.  Middle ear: Aerated.    Audiogram (date 12/16/20) interpreted and reviewed with the patient today showing:  Puretones: Right: moderate to normal SNHL Left: normal to mild SNHL  SRT: Right: 20 Left: 15  SD: Right: 100 Left: 100    Audiogram (date 2/28/20) interpreted and reviewed with the patient today showing:  Puretones: Right: moderate to mild SNHL Left: normal to mild SNHL  Tymps: Right: A Left: A  SRT: Right: 20 Left: 10  SD: Right: 100 Left: 100    Audiogram (date 12/12/2022) interpreted and reviewed with the patient today showing:  Puretones: Right: mild to normal to mild SNHL Left: normal to mild SNHL  SRT: Right: 5 Left: 10  SD: Right: 100 Left: 100     MRI IACS wo Contrast 5/13/2020  IMPRESSION:  A few scattered nonspecific punctate foci of increased signal intensity are seen  in the subcortical white matter without diffusion weighted correlate.    Images through the posterior fossa demonstrate normal morphology of the seventh  8th nerve complex bilaterally.    Study was performed without contrast which somewhat limits the sensitivity for  small lesions.     Latest Audiogram Result (Hz) Exam performed: 11/4/2024 9:30 AM Last edited by aSng  Simone Johnson on 11/4/2024 9:35 AM        125 250  1500 2000 3000 4000 6000 8000    Right air:  15 25  35  40  35 50 50    Right mastoid bone (masked):   25  40  30  30         Reliability:  Fair    Transducer:  Inserts    Technique:  Conventional Audiometry    Comments:            Latest Speech Audiometry  Last edited by Albina Domínguez Au.D on 11/4/2024 9:35 AM       Ear Method PTA SAT SRT [facility] Test/list Score (%) Intensity Mask/noise Notes    right live voice   30   10 By Difficulty 100 65  masked                  Latest Tympanogram Result       Probe Tone (Hz): 226 Exam performed: 11/4/2024 9:31 AM Last edited by Albina Domínguez Au.D on 11/4/2024 9:35 AM      Right Ear Tympanogram  This tympanogram was drawn by a user, not generated by device data                  Right Ear Left Ear    Tympanogram type: Type A     Canal volume (mL): 1.6     Peak pressure (daPa): 1     Peak amplitude (mL): 0.54     Tympanogram width (daPa):        Comments:                    Latest Audiogram and Tympanogram Result Text  Last edited by Albina Domínguez Au.D on 11/4/2024  9:52 AM      Addendum      Right ear testing only per Dr. Randolph    Otoscopic Inspection:  both ears: no cerumen and TM visualized    Summary  Right; Mild SNHL    Normal tympanogram for the right ear       Follow up with Amor Sanches M.D..  Audiological monitoring as needed during the course of medical management.       Addended by Albina Domínguez Au.D on 11/4/2024  9:52 AM                ASSESSMENT/PLAN:  Korin Preston is a 64 year old female with     ICD-10-CM   1. Sensorineural hearing loss (SNHL) of right ear with unrestricted hearing of left ear  H90.41   2. 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 Meniere's disease, right  H81.01   4. Migraine without aura and without status migrainosus, not intractable  G43.009   5. Otalgia of both ears  H92.03     Assessment & Plan  Hearing loss  Audiometric testing  shows slight decline in hearing, particularly in the right ear, with high clarity score. Non-neural cause suspected. Differential includes superior semicircular canal dehiscence syndrome. Current management with nortriptyline and dyazide may aid symptoms. Discussed hearing aids and insurance coverage.  - Order CT scan of the ear to evaluate for superior semicircular canal dehiscence.  - Discuss hearing aid options for the right ear.  - Advise to check insurance coverage for hearing aids.  - Continue nortriptyline and dyazide.  - Refill prescriptions for nortriptyline and dyazide.    Meniere's disease, right  Managed with nortriptyline and dyazide. Symptoms may relate to Meniere's. Treatment aims to manage symptoms and prevent vertigo. Discussed medication adjustments if symptoms persist.  - Continue nortriptyline and dyazide.  - Refill prescriptions for nortriptyline and dyazide.  - Consider increasing nortriptyline dosage or adding a second medication if symptoms persist.    Migraine without aura  Managed with nortriptyline, effectively controlling symptoms.  - Continue nortriptyline.  - Refill prescription for nortriptyline.    Medication management  On nortriptyline, dyazide, and sertraline. Potential interaction between sertraline and nortriptyline, but current low dose is safe. Discussed Wellbutrin initiation and monitoring for serotonin syndrome.  - Monitor for serotonin syndrome symptoms if starting Wellbutrin, such as feeling hot, flushed, or elevated temperatures.  - Continue current medications.  - Refill prescriptions for nortriptyline and dyazide.    Follow-up  Will be informed of CT scan results.Discussed scheduling and follow-up importance.  - Order CT scan of the ear.  - Advise to schedule CT scan via [employer].  - Instruct to contact office if no communication regarding CT scan results within a few days after scan.      IMPRESSION:  Migraine without aura   Otalgia, bilateral, likely neurologic   Right  Meniere's disease     PLAN:  - Will continue current medications and monitor if weather changes lead to more stable symptoms  - Continue nortriptyline 20 MG  - Possible side effects include fatigue, constipation, dry mouth, vivid dreams.  More rarely patients can experience increased blood pressure or tachycardia.  At this low-dose the medication can be stopped without a taper.  Most patients take the medication before bed as it can help them sleep well.  However if the patient feels that it keeps them awake at night they can take it during the daytime.   - Continue dyazide daily - refilled   - Follow up in 6 weeks     Situation reviewed with the patient in detail.    Amor Sanches MD  Otology/Otolaryngology  [address] [place] [address]  Phone [phone]  Fax [phone]

## 2025-04-16 NOTE — PROGRESS NOTES
The following individual(s) verbally consented to be recorded using ambient AI listening technology and understand that they can each withdraw their consent to this listening technology at any point by asking the clinician to turn off or pause the recording:  Patient consents yes to ARON HAMMER.  Patient name: Korin Preston

## 2025-06-11 ENCOUNTER — OFFICE VISIT (OUTPATIENT)
Dept: OTOLARYNGOLOGY | Facility: CLINIC | Age: 65
End: 2025-06-11
Payer: MEDICARE

## 2025-06-11 DIAGNOSIS — H81.01 MENIERE'S DISEASE, RIGHT: Primary | ICD-10-CM

## 2025-06-11 DIAGNOSIS — H90.41 SENSORINEURAL HEARING LOSS (SNHL) OF RIGHT EAR WITH UNRESTRICTED HEARING OF LEFT EAR: ICD-10-CM

## 2025-06-11 DIAGNOSIS — G43.009 MIGRAINE WITHOUT AURA AND WITHOUT STATUS MIGRAINOSUS, NOT INTRACTABLE: ICD-10-CM

## 2025-06-11 DIAGNOSIS — H92.03 OTALGIA OF BOTH EARS: ICD-10-CM

## 2025-06-11 PROCEDURE — 99214 OFFICE O/P EST MOD 30 MIN: CPT | Performed by: OTOLARYNGOLOGY

## 2025-06-11 NOTE — PROGRESS NOTES
The following individual(s) verbally consented to be recorded using ambient AI listening technology and understand that they can each withdraw their consent to this listening technology at any point by asking the clinician to turn off or pause the recording: Patient says yes to consent    Patient name: Korin Preston

## 2025-06-19 ENCOUNTER — HOSPITAL ENCOUNTER (OUTPATIENT)
Dept: CT IMAGING | Facility: HOSPITAL | Age: 65
Discharge: HOME | End: 2025-06-19
Attending: OTOLARYNGOLOGY
Payer: MEDICARE

## 2025-06-19 ENCOUNTER — HOSPITAL ENCOUNTER (OUTPATIENT)
Dept: CT IMAGING | Facility: HOSPITAL | Age: 65
Discharge: HOME | End: 2025-06-19
Attending: FAMILY MEDICINE

## 2025-06-19 DIAGNOSIS — Z13.6 SCREENING FOR CARDIOVASCULAR CONDITION: ICD-10-CM

## 2025-06-19 DIAGNOSIS — H83.8X1 SUPERIOR SEMICIRCULAR CANAL DEHISCENCE OF RIGHT EAR: ICD-10-CM

## 2025-06-19 PROCEDURE — 70480 CT ORBIT/EAR/FOSSA W/O DYE: CPT | Performed by: OTOLARYNGOLOGY

## 2025-06-23 NOTE — PROGRESS NOTES
PATIENT PROGRESS NOTE  OTOLOGY/OTOLARYNGOLOGY    REF MD:  No referring provider defined for this encounter.     PCP: Tania Concepcino MD    CHIEF COMPLAINT:    Chief Complaint   Patient presents with    Follow - Up     F/up discussion on medication her neurologist wants discontinued     History of Present Illness  Ms. Korin Preston is a 64 year old female with hearing loss and migraines who presents for follow-up on her current treatment regimen.    She has been experiencing ear pain and tinnitus, which prompted her to restart nortriptyline after a period of discontinuation. She has hearing loss, particularly in the right ear, and is considering a hearing aid due to difficulties in noisy environments like restaurants.    She has been on nortriptyline for migraines but is experiencing dry mouth as a side effect. She has been on this medication for over a year, stopped it, and then restarted it when symptoms returned. She is also taking bupropion and minoxidil 2.5 mg orally for hair loss.    She recently saw her OB GYN for menopause management and had an increase in her estradiol dosage. She has not noticed any significant changes with this adjustment.    She consulted with a neurologist who expressed concerns about nortriptyline and suggested exploring alternative treatments for her migraines. She is considering options like topiramate but is cautious due to a sulfa allergy from 40 years ago, which she plans to verify with an allergist.    Her blood pressure typically runs normal to low, and she has a family history of glaucoma, as her father had it. No history of kidney stones or glaucoma. She has an upcoming eye appointment.    PAST MEDICAL HISTORY:    Past Medical History:    Meniere's disease       PAST SURGICAL HISTORY:    Past Surgical History:   Procedure Laterality Date    Cholecystectomy  2022       Current Outpatient Medications on File Prior to Visit   Medication Sig Dispense Refill     triamterene-hydroCHLOROthiazide 37.5-25 MG Oral Cap Take 1 capsule by mouth daily. 90 capsule 3    nortriptyline 10 MG Oral Cap Take 2 capsules (20 mg total) by mouth nightly. 180 capsule 3    estradiol 0.025 MG/24HR Transdermal Patch Biweekly 1 patch twice a week.      progesterone 100 MG Oral Cap Take 1 capsule (100 mg total) by mouth nightly.      buPROPion  MG Oral Tablet 24 Hr Take 1 tablet (150 mg total) by mouth daily. 90 tablet 0    acetaminophen 325 MG Oral Tab Take 2 tablets (650 mg total) by mouth.      calcium carbonate 500 MG Oral Chew Tab Chew 1 tablet (500 mg total) by mouth.      Cholecalciferol (VITAMIN D) 50 MCG (2000 UT) Oral Cap Take 1 tablet by mouth daily.      omeprazole 20 MG Oral Capsule Delayed Release Take 1 capsule (20 mg total) by mouth daily.      sertraline 50 MG Oral Tab 135       No current facility-administered medications on file prior to visit.       Allergies:   Allergies   Allergen Reactions    Amoxicillin-Pot Clavulanate RASH     Tolerated ceftriaxone 22    Sulfamethoxazole W/Trimethoprim RASH       SOCIAL HISTORY:    Social History     Tobacco Use    Smoking status: Former     Current packs/day: 0.00     Types: Cigarettes     Quit date:      Years since quittin.4    Smokeless tobacco: Former   Substance Use Topics    Alcohol use: Yes     Comment: Occ       FAMILY HISTORY: Denies known family history of hearing loss, tinnitus, vertigo, or migraine.  Denies known family history of head and neck cancer, thyroid cancer, bleeding disorders.     REVIEW OF SYSTEMS:   Positives are in bold  Neuro: Headache, facial weakness, facial numbness, neck pain, vertigo  ENT: Hearing change, tinnitus, otorrhea, otalgia, aural fullness, ear pressure, vertigo, imbalance  Sinus pressure, rhinorrhea, congestion, facial pain, jaw pain, dysphagia, odynophagia, sore throat, voice changes, shortness of breath    EXAMINATION:  I washed my hands with an alcohol-based hand gel prior to  examination  Constitutional:   --Vitals: There were no vitals taken for this visit.  --General: no apparent distress, well-developed, conversant  Psych: affect pleasant and appropriate for age, alert and oriented  Neuro: Facial movement normal bilateral  Eyes: Pupils equal, symmetric and reactive to light.  Extra-ocular muscles intact  Respiratory: No stridor, stertor or increased work of breathing  ENT:  --Ear: The bilateral ears were examined under binocular microscopy  Right ear microscopic exam:  Pinna: Normal, no lesions or masses.  Mastoid: Nontender on palpation.   External auditory canal: Clear, no masses or lesions.  Tympanic membrane: Intact, no lesions, normal landmarks.  Middle ear: Aerated.    Left ear microscopic exam:  Pinna: Normal, no lesions or masses.  Mastoid: Nontender on palpation.   External auditory canal: Clear, no masses or lesions.  Tympanic membrane: Intact, no lesions, normal landmarks.  Middle ear: Aerated.    Audiogram (date 12/16/20) interpreted and reviewed with the patient today showing:  Puretones: Right: moderate to normal SNHL Left: normal to mild SNHL  SRT: Right: 20 Left: 15  SD: Right: 100 Left: 100    Audiogram (date 2/28/20) interpreted and reviewed with the patient today showing:  Puretones: Right: moderate to mild SNHL Left: normal to mild SNHL  Tymps: Right: A Left: A  SRT: Right: 20 Left: 10  SD: Right: 100 Left: 100    Audiogram (date 12/12/2022) interpreted and reviewed with the patient today showing:  Puretones: Right: mild to normal to mild SNHL Left: normal to mild SNHL  SRT: Right: 5 Left: 10  SD: Right: 100 Left: 100     MRI IACS wo Contrast 5/13/2020  IMPRESSION:  A few scattered nonspecific punctate foci of increased signal intensity are seen  in the subcortical white matter without diffusion weighted correlate.    Images through the posterior fossa demonstrate normal morphology of the seventh  8th nerve complex bilaterally.    Study was performed without  contrast which somewhat limits the sensitivity for  small lesions.     CT temporal bone wo contrast 6/192025  Impression   CONCLUSION:  1. There is thinning at the roof of both superior semicircular canals, but no convincing dehiscence is identified.  2. High position of the left jugular bulb.  3. Otherwise, unremarkable temporal bone CT.  The mastoid and middle ear cavities are well aerated.  No destructive/erosive osseous changes are seen.  4. Lesser incidental findings as above.       Latest Audiogram Result (Hz) Exam performed: 11/4/2024 9:30 AM Last edited by Albina Domínguez Au.D on 11/4/2024 9:35 AM        125 250  1500 2000 3000 4000 6000 8000    Right air:  15 25  35  40  35 50 50    Right mastoid bone (masked):   25  40  30  30         Reliability:  Fair    Transducer:  Inserts    Technique:  Conventional Audiometry    Comments:            Latest Speech Audiometry  Last edited by Albina Domínguez Au.D on 11/4/2024 9:35 AM       Ear Method PTA SAT SRT [facility] Test/list Score (%) Intensity Mask/noise Notes    right live voice   30   10 By Difficulty 100 65  masked                  Latest Tympanogram Result       Probe Tone (Hz): 226 Exam performed: 11/4/2024 9:31 AM Last edited by Albina Domínguez Au.D on 11/4/2024 9:35 AM      Right Ear Tympanogram  This tympanogram was drawn by a user, not generated by device data                  Right Ear Left Ear    Tympanogram type: Type A     Canal volume (mL): 1.6     Peak pressure (daPa): 1     Peak amplitude (mL): 0.54     Tympanogram width (daPa):        Comments:                    Latest Audiogram and Tympanogram Result Text  Last edited by Albina Domínguez Au.D on 11/4/2024  9:52 AM      Addendum      Right ear testing only per Dr. Randolph    Otoscopic Inspection:  both ears: no cerumen and TM visualized    Summary  Right; Mild SNHL    Normal tympanogram for the right ear       Follow up with Amor Sanches M.D..  Audiological monitoring as  needed during the course of medical management.       Addended by Albina Domínguez Au.D on 11/4/2024  9:52 AM                ASSESSMENT/PLAN:  Korin Preston is a 64 year old female with     ICD-10-CM   1. Meniere's disease, right  H81.01   2. Sensorineural hearing loss (SNHL) of right ear with unrestricted hearing of left ear  H90.41   3. Otalgia of both ears  H92.03   4. Migraine without aura and without status migrainosus, not intractable  G43.009       Assessment & Plan    Sensorineural hearing loss, right ear  Mild to moderate sensorineural hearing loss with good clarity. Hearing aid may improve hearing in noisy environments.  - Consider hearing aid for the right ear.    Otalgia of both ears  Recurrent ear pain linked to migraine and hearing issues, worsens off medication.    Migraine without aura, not intractable  Topiramate considered despite sulfa allergy. Propranolol unsuitable due to hypotension. Venlafaxine not preferred. Namenda discussed for prevention and cognitive benefits.  - Discontinue nortriptyline.  - Trial topiramate with monitoring for allergic reaction and cognitive side effects per neurology.  - Communicated with neurologist Dr. Nguyen regarding treatment options  - Consider Namenda for migraine prevention and cognitive benefits.    Right Meniere's disease  - Continue dyazide    Follow-up in 3 months       Situation reviewed with the patient in detail.    Amor Sanches MD  Otology/Otolaryngology  [facility]   [address] [place] [address] [address] [place] [address]  Phone [phone]  Fax [phone]

## 2025-06-27 ENCOUNTER — TELEPHONE (OUTPATIENT)
Dept: FAMILY MEDICINE CLINIC | Facility: CLINIC | Age: 65
End: 2025-06-27

## 2025-06-27 DIAGNOSIS — R91.8 OPACITY OF LUNG ON IMAGING STUDY: ICD-10-CM

## 2025-06-27 DIAGNOSIS — R93.89 ABNORMAL CHEST CT: Primary | ICD-10-CM

## 2025-06-27 DIAGNOSIS — R91.8 OPACITIES OF BOTH LUNGS PRESENT ON CHEST X-RAY: ICD-10-CM

## 2025-06-27 NOTE — TELEPHONE ENCOUNTER
Please call patient.  Heart scan showed a very low score.  I would  recommend beginning cholesterol-lowering medication to prevent further plaque buildup and to also stabilize the plaque that is currently there.  If she is agreeable, can begin Crestor 5 mg daily, #90, 3 refills.  Check C MP and lipids in 4 to 6 months. Please order if she begins meds    Additionally there were some small nodules noted in the lungs.  Radiologist suspects this is related to infection or inflammation.  She needs to have a repeat chest CT done in 3 to 6 months.  This has been ordered.

## 2025-06-30 RX ORDER — ROSUVASTATIN CALCIUM 5 MG/1
5 TABLET, COATED ORAL NIGHTLY
Qty: 90 TABLET | Refills: 3 | Status: SHIPPED | OUTPATIENT
Start: 2025-06-30

## 2025-06-30 NOTE — TELEPHONE ENCOUNTER
Spoke w/pt. Went over all results and discussed further testing ordered and starting Crestor. Pt asked for script to be called in but will be doing research before starting the med. She will let us know if she changes her mind.

## 2025-07-07 RX ORDER — BUPROPION HYDROCHLORIDE 150 MG/1
150 TABLET ORAL DAILY
Qty: 90 TABLET | Refills: 1 | Status: SHIPPED | OUTPATIENT
Start: 2025-07-07

## 2025-07-07 NOTE — TELEPHONE ENCOUNTER
For replies, please route to pool: [facility] CENTRAL REFILLS    Please review: Wellbutrin initiated at 4/15/25 office visit    No future appointments with primary care medicine

## 2025-07-20 ENCOUNTER — PATIENT MESSAGE (OUTPATIENT)
Dept: OTOLARYNGOLOGY | Facility: CLINIC | Age: 65
End: 2025-07-20

## 2025-07-22 RX ORDER — TOPIRAMATE 25 MG/1
25 TABLET, FILM COATED ORAL DAILY
Qty: 30 TABLET | Refills: 1 | Status: SHIPPED | OUTPATIENT
Start: 2025-07-22

## 2025-07-22 NOTE — TELEPHONE ENCOUNTER
-Topiramate - Possible side effects including but not limited to paraesthesias, GI upset, appetite suppression, change in taste, anhydrosis, cognitive side effect (forgetfullness, word finding deficit) discussed. This medication can be stopped without a taper at any time if needed.     Patient will start topiramate 25mg nightly. She is aware that she is allergic to sulfa drugs she may also be allergic to this medication. She was educated about the possible cross reactivity.

## 2025-08-11 ENCOUNTER — OFFICE VISIT (OUTPATIENT)
Dept: OTOLARYNGOLOGY | Facility: CLINIC | Age: 65
End: 2025-08-11

## 2025-08-11 VITALS
SYSTOLIC BLOOD PRESSURE: 99 MMHG | HEART RATE: 76 BPM | WEIGHT: 139.63 LBS | HEIGHT: 70 IN | BODY MASS INDEX: 19.99 KG/M2 | DIASTOLIC BLOOD PRESSURE: 63 MMHG

## 2025-08-11 DIAGNOSIS — G43.009 MIGRAINE WITHOUT AURA AND WITHOUT STATUS MIGRAINOSUS, NOT INTRACTABLE: ICD-10-CM

## 2025-08-11 DIAGNOSIS — H81.01 MENIERE'S DISEASE OF RIGHT EAR: Primary | ICD-10-CM

## 2025-08-11 PROCEDURE — 99214 OFFICE O/P EST MOD 30 MIN: CPT | Performed by: OTOLARYNGOLOGY

## 2025-08-11 RX ORDER — UREA 40 %
CREAM (GRAM) TOPICAL
COMMUNITY
Start: 2023-11-27

## 2025-08-11 RX ORDER — SODIUM, POTASSIUM,MAG SULFATES 17.5-3.13G
SOLUTION, RECONSTITUTED, ORAL ORAL
COMMUNITY
Start: 2025-01-22

## 2025-08-11 RX ORDER — MELATONIN
1000 DAILY
COMMUNITY

## 2025-08-11 RX ORDER — MINOXIDIL 2.5 MG/1
2.5 TABLET ORAL DAILY
COMMUNITY

## 2025-08-11 RX ORDER — CALCIUM CARBONATE 500(1250)
TABLET,CHEWABLE ORAL
COMMUNITY

## 2025-08-12 ENCOUNTER — PATIENT MESSAGE (OUTPATIENT)
Dept: OTOLARYNGOLOGY | Facility: CLINIC | Age: 65
End: 2025-08-12

## 2025-08-13 RX ORDER — MEMANTINE HYDROCHLORIDE 10 MG/1
10 TABLET ORAL DAILY
Qty: 30 TABLET | Refills: 1 | Status: SHIPPED | OUTPATIENT
Start: 2025-08-13